# Patient Record
Sex: MALE | Race: BLACK OR AFRICAN AMERICAN | NOT HISPANIC OR LATINO | URBAN - METROPOLITAN AREA
[De-identification: names, ages, dates, MRNs, and addresses within clinical notes are randomized per-mention and may not be internally consistent; named-entity substitution may affect disease eponyms.]

---

## 2020-01-20 ENCOUNTER — EMERGENCY (EMERGENCY)
Facility: HOSPITAL | Age: 20
LOS: 1 days | Discharge: HOME | End: 2020-01-20
Attending: EMERGENCY MEDICINE
Payer: SELF-PAY

## 2020-01-20 VITALS
HEART RATE: 62 BPM | WEIGHT: 179.9 LBS | OXYGEN SATURATION: 100 % | RESPIRATION RATE: 16 BRPM | SYSTOLIC BLOOD PRESSURE: 134 MMHG | HEIGHT: 67 IN | DIASTOLIC BLOOD PRESSURE: 73 MMHG | TEMPERATURE: 98 F

## 2020-01-20 LAB
FLU A RESULT: SIGNIFICANT CHANGE UP
FLU A RESULT: SIGNIFICANT CHANGE UP
FLUAV AG NPH QL: SIGNIFICANT CHANGE UP
FLUBV AG NPH QL: SIGNIFICANT CHANGE UP
RSV RESULT: SIGNIFICANT CHANGE UP
RSV RNA RESP QL NAA+PROBE: SIGNIFICANT CHANGE UP

## 2020-01-20 PROCEDURE — 87631 RESP VIRUS 3-5 TARGETS: CPT

## 2020-01-20 PROCEDURE — 94640 AIRWAY INHALATION TREATMENT: CPT

## 2020-01-20 PROCEDURE — 71046 X-RAY EXAM CHEST 2 VIEWS: CPT | Mod: 26

## 2020-01-20 PROCEDURE — 71046 X-RAY EXAM CHEST 2 VIEWS: CPT

## 2020-01-20 PROCEDURE — 99283 EMERGENCY DEPT VISIT LOW MDM: CPT | Mod: 25

## 2020-01-20 PROCEDURE — 99283 EMERGENCY DEPT VISIT LOW MDM: CPT

## 2020-01-20 RX ORDER — ALBUTEROL 90 UG/1
1 AEROSOL, METERED ORAL ONCE
Refills: 0 | Status: COMPLETED | OUTPATIENT
Start: 2020-01-20 | End: 2020-01-20

## 2020-01-20 RX ORDER — DEXAMETHASONE 0.5 MG/5ML
10 ELIXIR ORAL ONCE
Refills: 0 | Status: COMPLETED | OUTPATIENT
Start: 2020-01-20 | End: 2020-01-20

## 2020-01-20 RX ORDER — ALBUTEROL 90 UG/1
1 AEROSOL, METERED ORAL
Qty: 1 | Refills: 0
Start: 2020-01-20 | End: 2020-02-02

## 2020-01-20 RX ADMIN — Medication 10 MILLIGRAM(S): at 19:10

## 2020-01-20 RX ADMIN — ALBUTEROL 1 PUFF(S): 90 AEROSOL, METERED ORAL at 19:11

## 2020-01-20 NOTE — ED PROVIDER NOTE - CLINICAL SUMMARY MEDICAL DECISION MAKING FREE TEXT BOX
pt appears well appearing, showed a pic on phone  whcic hsowed blood tinges sputum, will do cxr, treat as acute bronchitis and r/o flu as well given peak season and no prior asthma history

## 2020-01-20 NOTE — ED ADULT TRIAGE NOTE - CHIEF COMPLAINT QUOTE
Pt with 3 days of cough and states he has yellow sputum as well as epistaxis when he coughs. Pt medicated with Nyquil 3 hrs PTA.

## 2020-01-20 NOTE — ED STATDOCS - PROGRESS NOTE DETAILS
CXR WNL  Pt feeling well on reassessment  Given rx for albuterol inhaler  F/u pcp, return precautions discussed

## 2020-01-20 NOTE — ED STATDOCS - PATIENT PORTAL LINK FT
You can access the FollowMyHealth Patient Portal offered by [facility] by registering at the following website: [email or web address]. By joining [email or web address]’s FollowMyHealth portal, you will also be able to view your health information using other applications (apps) compatible with our system.

## 2020-01-20 NOTE — ED PROVIDER NOTE - OBJECTIVE STATEMENT
20 y/o M with no med problems p/w cough for 1 day and had one episode of blood tinged sputum but no fever, chills, wheezing, sob. Pt denies recent travel or sick contact. UTD vaccines, non smoker, no drug use

## 2021-09-26 ENCOUNTER — APPOINTMENT (OUTPATIENT)
Dept: AFTER HOURS CARE | Facility: EMERGENCY ROOM | Age: 21
End: 2021-09-26
Payer: SELF-PAY

## 2021-09-26 DIAGNOSIS — K08.89 OTHER SPECIFIED DISORDERS OF TEETH AND SUPPORTING STRUCTURES: ICD-10-CM

## 2021-09-26 PROBLEM — Z78.9 OTHER SPECIFIED HEALTH STATUS: Chronic | Status: ACTIVE | Noted: 2020-01-20

## 2021-09-26 PROCEDURE — 99203 OFFICE O/P NEW LOW 30 MIN: CPT | Mod: 95

## 2021-10-04 PROBLEM — Z00.00 ENCOUNTER FOR PREVENTIVE HEALTH EXAMINATION: Status: ACTIVE | Noted: 2021-10-04

## 2021-10-05 PROBLEM — K08.89 PAIN, DENTAL: Status: ACTIVE | Noted: 2021-10-05

## 2021-10-05 NOTE — PLAN
[FreeTextEntry1] : 1)	Send Rx for augmentin and ibuprofen to [facility] [id number]\par 2)	 patient on the signs and symptoms of deep space neck infection requiring immediate emergency care, including but not limited to difficulty breathing or swallowing, throat swelling, submandibular swelling, tongue or floor of mouth swelling\par

## 2021-10-05 NOTE — HISTORY OF PRESENT ILLNESS
[Home] : at home, [unfilled] , at the time of the visit. [Other Location: e.g. Home (Enter Location, City,State)___] : at [unfilled] [Verbal consent obtained from patient] : the patient, [unfilled] [FreeTextEntry8] : 21 Yo m no pmh c/o toothache x 2 days. Pt reports pain to tooth at bottom right that he believes is a wisdom tooth. He reports that side of his jaw in the area of the tooth feels swollen and he spits frequently. Pt reports he is able to swallow and denies any throat pain or swelling, just pain in the area of the tooth when he swallows. He denies any f/c/ns, pain to the submandibular region or floor of mouth, tongue swelling or pain, sore throat, difficulty breathing, or other complaints. He tried to make a dentist’s appointment 2 days ago but the date given was too far away and he couldn’t wait that long. He last saw a dentist 3 years ago. CHRISTINE

## 2021-10-05 NOTE — PHYSICAL EXAM
[de-identified] : General: pt appears stated age, well-appearing, and is not in distress, speaking in full clear sentences\par HEENT: AT/NC, pink conjunctiva, anicteric sclerae, EOMI, mmm, on self exam negative tracheal rock, no submandibular tenderness or swelling, + tenderness to right lower jaw in the area of affected tooth\par Neck: supple, full ROM, trachea midline\par Lungs: symmetric excursion, no respiratory distress\par Extremities: no obvious deformities or fractures\par Skin: good turgor; no rashes, petechiae, ecchymoses, or jaundice\par Neuro: awake, alert, responsive; oriented to person, place and time; cranial nerves grossly intact, EOMI intact jaw movement, no facial asymmetry, hearing intact\par

## 2021-10-05 NOTE — ASSESSMENT
[FreeTextEntry1] : Pt w/ aforementioned presentation w/ no airway compromise or submandibular swelling or edema to suggest valorie’s angina or deep space neck infection, but concerning for dental infection.
